# Patient Record
Sex: FEMALE | Race: WHITE | NOT HISPANIC OR LATINO | Employment: OTHER | URBAN - METROPOLITAN AREA
[De-identification: names, ages, dates, MRNs, and addresses within clinical notes are randomized per-mention and may not be internally consistent; named-entity substitution may affect disease eponyms.]

---

## 2018-12-13 ENCOUNTER — NEW PATIENT COMPREHENSIVE (OUTPATIENT)
Dept: URBAN - METROPOLITAN AREA CLINIC 39 | Facility: CLINIC | Age: 73
End: 2018-12-13

## 2018-12-13 DIAGNOSIS — H25.813: ICD-10-CM

## 2018-12-13 DIAGNOSIS — H04.123: ICD-10-CM

## 2018-12-13 PROCEDURE — 92004 COMPRE OPH EXAM NEW PT 1/>: CPT

## 2018-12-13 PROCEDURE — 1036F TOBACCO NON-USER: CPT

## 2018-12-13 PROCEDURE — G9903 PT SCRN TBCO ID AS NON USER: HCPCS

## 2018-12-13 PROCEDURE — G8427 DOCREV CUR MEDS BY ELIG CLIN: HCPCS

## 2018-12-13 PROCEDURE — G8785 BP SCRN NO PERF AT INTERVAL: HCPCS

## 2018-12-13 PROCEDURE — 92015 DETERMINE REFRACTIVE STATE: CPT

## 2018-12-13 ASSESSMENT — VISUAL ACUITY
OS_BAT: 20/50
OS_SC: <J12
OD_SC: <J12
OS_CC: J2
OD_CC: J2
OD_BAT: 20/50
OD_SC: 20/70
OS_SC: 20/70
OS_CC: 20/30-2
OD_CC: 20/40+1

## 2018-12-13 ASSESSMENT — TONOMETRY
OS_IOP_MMHG: 16
OD_IOP_MMHG: 16

## 2019-01-09 ENCOUNTER — CATARACT CONSULT (OUTPATIENT)
Dept: URBAN - METROPOLITAN AREA CLINIC 43 | Facility: CLINIC | Age: 74
End: 2019-01-09

## 2019-01-09 VITALS
HEIGHT: 55 IN | SYSTOLIC BLOOD PRESSURE: 164 MMHG | HEART RATE: 77 BPM | DIASTOLIC BLOOD PRESSURE: 99 MMHG | RESPIRATION RATE: 14 BRPM

## 2019-01-09 DIAGNOSIS — H25.812: ICD-10-CM

## 2019-01-09 DIAGNOSIS — H40.023: ICD-10-CM

## 2019-01-09 DIAGNOSIS — H18.51: ICD-10-CM

## 2019-01-09 DIAGNOSIS — H04.123: ICD-10-CM

## 2019-01-09 DIAGNOSIS — H35.3130: ICD-10-CM

## 2019-01-09 DIAGNOSIS — H25.811: ICD-10-CM

## 2019-01-09 PROCEDURE — 99214 OFFICE O/P EST MOD 30 MIN: CPT

## 2019-01-09 PROCEDURE — 4040F PNEUMOC VAC/ADMIN/RCVD: CPT

## 2019-01-09 PROCEDURE — G8952 PRE-HTN/HTN, NO F/U, NOT GVN: HCPCS

## 2019-01-09 PROCEDURE — 92286 ANT SGM IMG I&R SPECLR MIC: CPT

## 2019-01-09 PROCEDURE — V2799I IMPRIMIS

## 2019-01-09 PROCEDURE — 92136TC INTERFEROMETRY - TECHNICAL COMPONENT

## 2019-01-09 PROCEDURE — 92133 CPTRZD OPH DX IMG PST SGM ON: CPT

## 2019-01-09 PROCEDURE — G8428 CUR MEDS NOT DOCUMENT: HCPCS

## 2019-01-09 PROCEDURE — 1036F TOBACCO NON-USER: CPT

## 2019-01-09 PROCEDURE — G8482 FLU IMMUNIZE ORDER/ADMIN: HCPCS

## 2019-01-09 PROCEDURE — G9903 PT SCRN TBCO ID AS NON USER: HCPCS

## 2019-01-09 PROCEDURE — 92134 CPTRZ OPH DX IMG PST SGM RTA: CPT

## 2019-01-09 ASSESSMENT — VISUAL ACUITY
OD_BAT: 20/50
OD_SC: <J12
OD_SC: 20/100
OS_CC: J2
OS_SC: <J12
OS_PH: 20/30-1
OS_AM: 20/20
OS_SC: 20/100
OD_PAM: 20/20-2
OS_BAT: 20/50
OD_CC: J1-
OD_CC: 20/25
OS_CC: 20/40

## 2019-01-09 ASSESSMENT — TONOMETRY
OD_IOP_MMHG: 14
OS_IOP_MMHG: 14

## 2019-03-04 ENCOUNTER — SURGERY/PROCEDURE (OUTPATIENT)
Dept: URBAN - METROPOLITAN AREA SURGERY 14 | Facility: SURGERY | Age: 74
End: 2019-03-04

## 2019-03-04 ENCOUNTER — PRE-OP/H&P (OUTPATIENT)
Dept: URBAN - METROPOLITAN AREA CLINIC 39 | Facility: CLINIC | Age: 74
End: 2019-03-04

## 2019-03-04 VITALS
RESPIRATION RATE: 14 BRPM | HEART RATE: 77 BPM | DIASTOLIC BLOOD PRESSURE: 99 MMHG | SYSTOLIC BLOOD PRESSURE: 164 MMHG | HEIGHT: 55 IN

## 2019-03-04 DIAGNOSIS — H40.023: ICD-10-CM

## 2019-03-04 DIAGNOSIS — H25.812: ICD-10-CM

## 2019-03-04 DIAGNOSIS — H25.811: ICD-10-CM

## 2019-03-04 DIAGNOSIS — H18.51: ICD-10-CM

## 2019-03-04 DIAGNOSIS — H04.123: ICD-10-CM

## 2019-03-04 DIAGNOSIS — H35.3130: ICD-10-CM

## 2019-03-04 PROCEDURE — 99211T TECH SERVICE

## 2019-03-04 PROCEDURE — 66984 XCAPSL CTRC RMVL W/O ECP: CPT

## 2019-03-05 ENCOUNTER — POST OP/EVAL OF SECOND EYE (OUTPATIENT)
Dept: URBAN - METROPOLITAN AREA CLINIC 43 | Facility: CLINIC | Age: 74
End: 2019-03-05

## 2019-03-05 DIAGNOSIS — H25.811: ICD-10-CM

## 2019-03-05 DIAGNOSIS — Z96.1: ICD-10-CM

## 2019-03-05 DIAGNOSIS — H40.023: ICD-10-CM

## 2019-03-05 PROCEDURE — 99213 OFFICE O/P EST LOW 20 MIN: CPT

## 2019-03-05 PROCEDURE — 99024 POSTOP FOLLOW-UP VISIT: CPT

## 2019-03-05 ASSESSMENT — VISUAL ACUITY
OD_SC: 20/100
OD_SC: J12
OS_SC: J10
OD_PH: 20/40
OS_SC: 20/30-2

## 2019-03-05 ASSESSMENT — TONOMETRY
OD_IOP_MMHG: 14
OS_IOP_MMHG: 14

## 2019-03-11 ENCOUNTER — PRE-OP/H&P (OUTPATIENT)
Dept: URBAN - METROPOLITAN AREA CLINIC 39 | Facility: CLINIC | Age: 74
End: 2019-03-11

## 2019-03-11 ENCOUNTER — SURGERY/PROCEDURE (OUTPATIENT)
Dept: URBAN - METROPOLITAN AREA SURGERY 14 | Facility: SURGERY | Age: 74
End: 2019-03-11

## 2019-03-11 VITALS
HEIGHT: 55 IN | SYSTOLIC BLOOD PRESSURE: 164 MMHG | RESPIRATION RATE: 14 BRPM | HEART RATE: 77 BPM | DIASTOLIC BLOOD PRESSURE: 99 MMHG

## 2019-03-11 DIAGNOSIS — Z96.1: ICD-10-CM

## 2019-03-11 DIAGNOSIS — H25.811: ICD-10-CM

## 2019-03-11 PROCEDURE — 99211T TECH SERVICE

## 2019-03-11 PROCEDURE — 66984 XCAPSL CTRC RMVL W/O ECP: CPT

## 2019-03-11 ASSESSMENT — VISUAL ACUITY
OD_SC: J12
OD_SC: 20/100
OS_SC: J12
OS_SC: 20/25

## 2019-03-12 ENCOUNTER — 1 DAY POST-OP (OUTPATIENT)
Dept: URBAN - METROPOLITAN AREA CLINIC 43 | Facility: CLINIC | Age: 74
End: 2019-03-12

## 2019-03-12 DIAGNOSIS — Z96.1: ICD-10-CM

## 2019-03-12 PROCEDURE — 99024 POSTOP FOLLOW-UP VISIT: CPT

## 2019-03-12 ASSESSMENT — TONOMETRY
OD_IOP_MMHG: 14
OS_IOP_MMHG: 14

## 2019-03-12 ASSESSMENT — VISUAL ACUITY
OS_SC: 20/30+1
OD_SC: 20/30-1

## 2019-03-27 ENCOUNTER — POST-OP (OUTPATIENT)
Dept: URBAN - METROPOLITAN AREA CLINIC 43 | Facility: CLINIC | Age: 74
End: 2019-03-27

## 2019-03-27 DIAGNOSIS — Z96.1: ICD-10-CM

## 2019-03-27 PROCEDURE — 99024 POSTOP FOLLOW-UP VISIT: CPT

## 2019-03-27 ASSESSMENT — TONOMETRY
OD_IOP_MMHG: 14
OS_IOP_MMHG: 16

## 2019-03-27 ASSESSMENT — VISUAL ACUITY
OS_SC: J10
OS_CC: J1
OD_SC: 20/30-1
OD_SC: J10
OS_SC: 20/30-1
OD_CC: J1

## 2019-10-07 ENCOUNTER — EST. PATIENT EMERGENCY (OUTPATIENT)
Dept: URBAN - METROPOLITAN AREA CLINIC 43 | Facility: CLINIC | Age: 74
End: 2019-10-07

## 2019-10-07 DIAGNOSIS — H35.3130: ICD-10-CM

## 2019-10-07 DIAGNOSIS — H43.812: ICD-10-CM

## 2019-10-07 DIAGNOSIS — H40.023: ICD-10-CM

## 2019-10-07 DIAGNOSIS — H43.12: ICD-10-CM

## 2019-10-07 PROCEDURE — 92012 INTRM OPH EXAM EST PATIENT: CPT

## 2019-10-07 PROCEDURE — 92134 CPTRZ OPH DX IMG PST SGM RTA: CPT

## 2019-10-07 ASSESSMENT — TONOMETRY
OS_IOP_MMHG: 13
OD_IOP_MMHG: 14

## 2019-10-07 ASSESSMENT — VISUAL ACUITY
OS_CC: 20/25-2
OD_CC: 20/25-1

## 2019-10-23 ENCOUNTER — ESTABLISHED COMPREHENSIVE EXAM (OUTPATIENT)
Dept: URBAN - METROPOLITAN AREA CLINIC 43 | Facility: CLINIC | Age: 74
End: 2019-10-23

## 2019-10-23 DIAGNOSIS — H52.4: ICD-10-CM

## 2019-10-23 DIAGNOSIS — H43.812: ICD-10-CM

## 2019-10-23 DIAGNOSIS — H52.03: ICD-10-CM

## 2019-10-23 DIAGNOSIS — H43.12: ICD-10-CM

## 2019-10-23 PROCEDURE — 92015 DETERMINE REFRACTIVE STATE: CPT

## 2019-10-23 PROCEDURE — 92014 COMPRE OPH EXAM EST PT 1/>: CPT

## 2019-10-23 ASSESSMENT — VISUAL ACUITY
OD_SC: 20/40-1
OS_SC: 20/30-1
OD_CC: 20/25-2
OS_CC: 20/30
OS_CC: J2
OD_SC: J12
OD_CC: J2
OS_SC: J12

## 2019-10-23 ASSESSMENT — TONOMETRY
OD_IOP_MMHG: 12
OS_IOP_MMHG: 12

## 2020-03-10 NOTE — PATIENT DISCUSSION
Decision to treat was made based on today's clinical findings.  PED/SRF, continue with injection today.

## 2020-03-10 NOTE — PATIENT DISCUSSION
We reviewed the signs and symptoms of retinal tear/retinal detachment and the importance of prompt evaluation should there be increasing floaters, new flashing lights, or decreasing peripheral vision in either eye at any time.  No evidence of RT/RD.

## 2020-03-10 NOTE — PATIENT DISCUSSION
Patient receiving NELI q monthly up [place], last injection 2/07/20.  Recommend continuing treatment q 4-5 weeks.

## 2020-03-10 NOTE — PATIENT DISCUSSION
Discussed in detail re: nature of condition, dry vs wet AMD, AREDS.  PED, no fluid, no cnversion to wet.

## 2020-03-10 NOTE — PROCEDURE NOTE: CLINICAL

## 2020-04-07 NOTE — PATIENT DISCUSSION
Decision to treat was made based on today's clinical findings. Increased PED/SRF, continue with injection today.

## 2020-04-07 NOTE — PROCEDURE NOTE: CLINICAL
PROCEDURE NOTE: Eylea Prefilled Syringe 2mg OD. Diagnosis: Neovascular AMD with Active CNV. Prior to injection, risks/benefits/alternatives discussed including corneal abrasion, infection, loss of vision, hemorrhage, cataract, glaucoma, retinal tears or detachment. A written consent is on file, and the need for today's injection was discussed and the patient is understanding and wishes to proceed. A 30G needle was placed on an Eylea 2mg/0.05ml Pre-filled Syringe. Betadine prep was performed. Topical anesthesia was induced with Alcaine. 4% lidocaine pledge. A lid speculum was used. An intravitreal injection of Eylea was given. Injection site: 3-4 mm from the limbus. The used syringe/needle was transferred to a biohazard container. Lid speculum removed. Mask worn during procedure. Patient tolerated procedure well. Count fingers vision was verified. There were no complications. Patient was given the standard instruction sheet. Alberta Grider

## 2020-05-15 NOTE — PROCEDURE NOTE: CLINICAL
PROCEDURE NOTE: Eylea Prefilled Syringe 2mg OD. Diagnosis: Neovascular AMD with Active CNV. Prior to injection, risks/benefits/alternatives discussed including corneal abrasion, infection, loss of vision, hemorrhage, cataract, glaucoma, retinal tears or detachment. A written consent is on file, and the need for today's injection was discussed and the patient is understanding and wishes to proceed. A 30G needle was placed on an Eylea 2mg/0.05ml Pre-filled Syringe. Betadine prep was performed. Topical anesthesia was induced with Alcaine. 4% lidocaine pledge. A lid speculum was used. An intravitreal injection of Eylea was given. Injection site: 3-4 mm from the limbus. The used syringe/needle was transferred to a biohazard container. Lid speculum removed. Mask worn during procedure. Patient tolerated procedure well. Count fingers vision was verified. There were no complications. Patient was given the standard instruction sheet. Zo Perea

## 2020-05-15 NOTE — PATIENT DISCUSSION
Discussed in detail re: nature of condition, dry vs wet AMD, AREDS.  Decreased PED, no fluid, no conversion to wet.

## 2020-05-15 NOTE — PATIENT DISCUSSION
Decision to treat was made based on today's clinical findings. Slight Decreased PED/SRF, continue with injection today.

## 2020-11-04 ENCOUNTER — ESTABLISHED COMPREHENSIVE EXAM (OUTPATIENT)
Dept: URBAN - METROPOLITAN AREA CLINIC 43 | Facility: CLINIC | Age: 75
End: 2020-11-04

## 2020-11-04 DIAGNOSIS — H52.03: ICD-10-CM

## 2020-11-04 DIAGNOSIS — H04.123: ICD-10-CM

## 2020-11-04 DIAGNOSIS — H26.493: ICD-10-CM

## 2020-11-04 PROCEDURE — 92014 COMPRE OPH EXAM EST PT 1/>: CPT

## 2020-11-04 PROCEDURE — 92015 DETERMINE REFRACTIVE STATE: CPT

## 2020-11-04 ASSESSMENT — VISUAL ACUITY
OD_CC: J1
OS_SC: J12
OS_CC: 20/25-2
OS_SC: 20/30+2
OD_SC: 20/50+2
OD_CC: 20/25-2
OS_CC: J2
OD_SC: J8-

## 2020-11-04 ASSESSMENT — TONOMETRY
OS_IOP_MMHG: 12
OD_IOP_MMHG: 12

## 2020-11-12 ENCOUNTER — YAG EVALUATION (OUTPATIENT)
Dept: URBAN - METROPOLITAN AREA SURGERY 14 | Facility: SURGERY | Age: 75
End: 2020-11-12

## 2020-11-12 ENCOUNTER — SURGERY/PROCEDURE (OUTPATIENT)
Dept: URBAN - METROPOLITAN AREA SURGERY 14 | Facility: SURGERY | Age: 75
End: 2020-11-12

## 2020-11-12 DIAGNOSIS — H26.493: ICD-10-CM

## 2020-11-12 DIAGNOSIS — H04.123: ICD-10-CM

## 2020-11-12 PROCEDURE — 92014 COMPRE OPH EXAM EST PT 1/>: CPT

## 2020-11-12 PROCEDURE — 66821 AFTER CATARACT LASER SURGERY: CPT

## 2020-11-12 ASSESSMENT — TONOMETRY
OS_IOP_MMHG: 12
OD_IOP_MMHG: 12

## 2020-11-12 ASSESSMENT — VISUAL ACUITY
OD_CC: 20/25
OD_BAT: 20/50
OS_BAT: 20/50
OS_CC: 20/30

## 2020-11-17 NOTE — PROCEDURE NOTE: CLINICAL

## 2020-11-18 ENCOUNTER — YAG POST-OP (OUTPATIENT)
Dept: URBAN - METROPOLITAN AREA CLINIC 43 | Facility: CLINIC | Age: 75
End: 2020-11-18

## 2020-11-18 DIAGNOSIS — Z98.890: ICD-10-CM

## 2020-11-18 PROCEDURE — 99024 POSTOP FOLLOW-UP VISIT: CPT

## 2020-11-18 ASSESSMENT — VISUAL ACUITY
OS_SC: J12
OD_SC: 20/30-1
OD_SC: J10
OS_SC: 20/40+1

## 2020-11-18 ASSESSMENT — TONOMETRY
OS_IOP_MMHG: 10
OD_IOP_MMHG: 10

## 2020-12-23 NOTE — PROCEDURE NOTE: CLINICAL

## 2021-01-27 NOTE — PROCEDURE NOTE: CLINICAL

## 2021-01-27 NOTE — PATIENT DISCUSSION
We reviewed the pathophysiology of posterior vitreous detachment and the occasional association with retinal tear and retinal detachment.  No RT/RD.

## 2021-01-27 NOTE — PATIENT DISCUSSION
Decision to treat was made based on today's clinical findings.  PED, Fluid present today, patient has converted to wet.

## 2021-03-03 NOTE — PROCEDURE NOTE: CLINICAL

## 2021-04-06 NOTE — PROCEDURE NOTE: CLINICAL

## 2021-05-04 NOTE — PROCEDURE NOTE: CLINICAL

## 2021-05-04 NOTE — PATIENT DISCUSSION
We reviewed the signs and symptoms of retinal tear/retinal detachment and the importance of prompt evaluation should there be increasing floaters, new flashing lights, or decreasing peripheral vision in either eye at any time. No RT/RD.

## 2021-05-04 NOTE — PATIENT DISCUSSION
Patient receiving injections q monthly while in [place], recommend patient follow up with his retina specialist in 5 weeks once he returns home, treat and extend as tolerated.

## 2021-10-15 ENCOUNTER — ESTABLISHED COMPREHENSIVE EXAM (OUTPATIENT)
Dept: URBAN - METROPOLITAN AREA CLINIC 43 | Facility: CLINIC | Age: 76
End: 2021-10-15

## 2021-10-15 DIAGNOSIS — H52.4: ICD-10-CM

## 2021-10-15 DIAGNOSIS — H43.813: ICD-10-CM

## 2021-10-15 DIAGNOSIS — H52.03: ICD-10-CM

## 2021-10-15 DIAGNOSIS — H35.363: ICD-10-CM

## 2021-10-15 PROCEDURE — 92250 FUNDUS PHOTOGRAPHY W/I&R: CPT

## 2021-10-15 PROCEDURE — 92014 COMPRE OPH EXAM EST PT 1/>: CPT

## 2021-10-15 PROCEDURE — 92015 DETERMINE REFRACTIVE STATE: CPT

## 2021-10-15 ASSESSMENT — VISUAL ACUITY
OD_SC: 20/40
OS_SC: J12
OS_BAT: 20/50
OD_CC: 20/40
OD_PH: 20/30-1
OS_CC: J3
OS_CC: 20/30-2
OS_SC: 20/40-1
OD_SC: J12
OD_CC: J3

## 2021-10-15 ASSESSMENT — TONOMETRY
OS_IOP_MMHG: 12
OD_IOP_MMHG: 11

## 2022-02-16 NOTE — PROCEDURE NOTE: CLINICAL

## 2022-03-22 NOTE — PROCEDURE NOTE: CLINICAL

## 2022-04-27 NOTE — PROCEDURE NOTE: CLINICAL

## 2022-04-27 NOTE — PATIENT DISCUSSION
Increased PED, IRF/SRF  worse. pt has been receiving injections here in [place] every 4-5 weeks, IRF/SRF present, Recc pt to follow up with [facility] in [place] in 4-5 weeks w/ Gaby Dooley.

## 2022-04-27 NOTE — PATIENT DISCUSSION
An examination that was significantly and separately identifiable from the procedure performed today was also completed for this Hypertensive Retinopathy.

## 2022-04-27 NOTE — PATIENT DISCUSSION
Increased SRF, PED  worse. pt has been receiving injections here in [place] every 4-5 weeks, SRF present, Recc pt to follow up with [facility] in [place] in 4-5 weeks morgan/ Lupillo Burns

## 2022-10-18 ENCOUNTER — COMPREHENSIVE EXAM (OUTPATIENT)
Dept: URBAN - METROPOLITAN AREA CLINIC 43 | Facility: CLINIC | Age: 77
End: 2022-10-18

## 2022-10-18 DIAGNOSIS — H35.363: ICD-10-CM

## 2022-10-18 DIAGNOSIS — H43.813: ICD-10-CM

## 2022-10-18 DIAGNOSIS — H52.4: ICD-10-CM

## 2022-10-18 DIAGNOSIS — H26.492: ICD-10-CM

## 2022-10-18 DIAGNOSIS — H35.30: ICD-10-CM

## 2022-10-18 DIAGNOSIS — H52.03: ICD-10-CM

## 2022-10-18 PROCEDURE — 92015 DETERMINE REFRACTIVE STATE: CPT

## 2022-10-18 PROCEDURE — 92014 COMPRE OPH EXAM EST PT 1/>: CPT

## 2022-10-18 ASSESSMENT — TONOMETRY
OD_IOP_MMHG: 12
OS_IOP_MMHG: 12

## 2022-10-18 ASSESSMENT — VISUAL ACUITY
OD_CC: J1
OD_SC: 20/50
OD_PH: 20/40
OS_SC: 20/50-1
OS_SC: J12
OS_PH: 20/40
OD_SC: J12
OD_CC: 20/50+1
OS_CC: J3
OS_CC: 20/50+1

## 2022-11-30 ENCOUNTER — SURGERY/PROCEDURE (OUTPATIENT)
Dept: URBAN - METROPOLITAN AREA SURGERY 14 | Facility: SURGERY | Age: 77
End: 2022-11-30

## 2022-11-30 ENCOUNTER — CONSULTATION/EVALUATION (OUTPATIENT)
Dept: URBAN - METROPOLITAN AREA CLINIC 39 | Facility: CLINIC | Age: 77
End: 2022-11-30

## 2022-11-30 DIAGNOSIS — H26.492: ICD-10-CM

## 2022-11-30 DIAGNOSIS — H35.363: ICD-10-CM

## 2022-11-30 DIAGNOSIS — H35.30: ICD-10-CM

## 2022-11-30 DIAGNOSIS — H43.813: ICD-10-CM

## 2022-11-30 PROCEDURE — 66821 AFTER CATARACT LASER SURGERY: CPT

## 2022-11-30 PROCEDURE — 92014 COMPRE OPH EXAM EST PT 1/>: CPT

## 2022-11-30 ASSESSMENT — TONOMETRY: OS_IOP_MMHG: 14

## 2022-11-30 ASSESSMENT — VISUAL ACUITY
OS_CC: 20/30
OS_SC: 20/40
OS_BAT: 20/80

## 2022-12-07 ENCOUNTER — POST-OP (OUTPATIENT)
Dept: URBAN - METROPOLITAN AREA CLINIC 43 | Facility: CLINIC | Age: 77
End: 2022-12-07

## 2022-12-07 PROCEDURE — 99024 POSTOP FOLLOW-UP VISIT: CPT

## 2022-12-07 ASSESSMENT — TONOMETRY
OS_IOP_MMHG: 12
OD_IOP_MMHG: 12

## 2022-12-07 ASSESSMENT — VISUAL ACUITY
OD_PH: 20/40+2
OD_SC: 20/40-2
OS_PH: 20/30-2
OS_SC: 20/40+2
OS_SC: J8
OD_SC: J8

## 2022-12-21 NOTE — PATIENT DISCUSSION
An examination that was significantly and separately identifiable from the procedure performed today was also completed for this VME.

## 2022-12-21 NOTE — PROCEDURE NOTE: CLINICAL
PROCEDURE NOTE: Eylea Prefilled Syringe 2mg OS. Diagnosis: Neovascular AMD with Active CNV. Prep: Betadine Drops and Betadine Scrub. The patient was identified visually and verbally by the  surgeon. The procedure eye was marked with an adhesive arrow sticker. The  risks, benefits, alternatives and possible complications of the procedure  were discussed with the patient and informed consent was obtained. The  patient was positioned in the chair. Proparacaine, Betadine, Akten administered prior to injection. Additional Betadine was used. A speculum  was used to hold the eyelid open. A caliper was used to marked the site of  injection 3.5-4mm from the limbus. Betadine was placed on the site . A sterile 30 or 31 gauge needle with the  medication entered the vitreous cavity and the medication was  administered. The speculum was removed. The patient was instructed to call immediately if any  significant visual loss, swelling, discharge, or pain occurred Intravitreal injection. Patient tolerated the procedure well. There were no complications. CF vision checked. Post procedure instructions given. Yelitza Barton PROCEDURE NOTE: Vabysmo Injection Sample OD. Diagnosis: Neovascular AMD with Active CNV. Anesthesia: Topical. Prep: Betadine Drops and Betadine Scrub. Prior to injection, risks/benefits/alternatives discussed including corneal abrasion, infection, loss of vision, hemorrhage, cataract, glaucoma, retinal tears or detachment. A written consent is on file, and the need for today's injection was discussed and the patient is understanding and wishes to proceed. A 30G needle was placed on a Vabysmo 6mg/0.05ml Pre-filled Syringe. Betadine prep was performed. Topical anesthesia was induced with Alcaine. 4% lidocaine pledge. A lid speculum was used. An intravitreal injection of Vabysmo was given. Injection site: 3-4 mm from the limbus. The used syringe/needle was transferred to a biohazard container. Lid speculum removed. Mask worn during procedure. Patient tolerated procedure well. Count fingers vision was verified. There were no complications. Patient was given the standard instruction sheet. Yelitza Barton

## 2022-12-21 NOTE — PATIENT DISCUSSION
Recommended Eylea injection. Pt has been receiving injections q 5-6 weeks up [facility] at [facility] at [place], [place] with Dr. Joselyn Monzon, last injection was on 11/11/22.

## 2022-12-21 NOTE — PATIENT DISCUSSION
Recommended [facility] Sample injection. Pt has been receiving injections q 5-6 weeks up [place] at [facility] at [place], [place] with Dr. Cody Nichole, last injection was on 11/11/22.

## 2022-12-21 NOTE — PATIENT DISCUSSION
Vabysmo Sample  injection recommended today after discussion of benefits, risks, alternatives. The injection was administered without complication. Post-injection instructions were reviewed and understood by the patient.

## 2023-10-04 ENCOUNTER — COMPREHENSIVE EXAM (OUTPATIENT)
Dept: URBAN - METROPOLITAN AREA CLINIC 43 | Facility: CLINIC | Age: 78
End: 2023-10-04

## 2023-10-04 DIAGNOSIS — H43.813: ICD-10-CM

## 2023-10-04 DIAGNOSIS — H35.30: ICD-10-CM

## 2023-10-04 DIAGNOSIS — H04.123: ICD-10-CM

## 2023-10-04 DIAGNOSIS — H40.023: ICD-10-CM

## 2023-10-04 DIAGNOSIS — H52.03: ICD-10-CM

## 2023-10-04 DIAGNOSIS — H52.4: ICD-10-CM

## 2023-10-04 PROCEDURE — 92015 DETERMINE REFRACTIVE STATE: CPT

## 2023-10-04 PROCEDURE — 92014 COMPRE OPH EXAM EST PT 1/>: CPT

## 2023-10-04 ASSESSMENT — VISUAL ACUITY
OS_SC: 20/30-1
OU_SC: 20/25
OD_SC: J12
OU_CC: J1
OD_CC: 20/25-2
OU_SC: J12
OS_CC: J3
OS_CC: 20/30-2
OD_SC: 20/25-2
OU_CC: 20/25-1
OS_SC: J12
OD_CC: J2

## 2023-10-04 ASSESSMENT — TONOMETRY
OD_IOP_MMHG: 10
OS_IOP_MMHG: 12

## 2024-10-29 ENCOUNTER — COMPREHENSIVE EXAM (OUTPATIENT)
Dept: URBAN - METROPOLITAN AREA CLINIC 43 | Facility: CLINIC | Age: 79
End: 2024-10-29

## 2024-10-29 DIAGNOSIS — H35.30: ICD-10-CM

## 2024-10-29 DIAGNOSIS — H43.813: ICD-10-CM

## 2024-10-29 DIAGNOSIS — H04.123: ICD-10-CM

## 2024-10-29 DIAGNOSIS — H52.03: ICD-10-CM

## 2024-10-29 DIAGNOSIS — H40.023: ICD-10-CM

## 2024-10-29 PROCEDURE — 92015 DETERMINE REFRACTIVE STATE: CPT

## 2024-10-29 PROCEDURE — 92014 COMPRE OPH EXAM EST PT 1/>: CPT
